# Patient Record
Sex: MALE | Race: OTHER | HISPANIC OR LATINO | URBAN - METROPOLITAN AREA
[De-identification: names, ages, dates, MRNs, and addresses within clinical notes are randomized per-mention and may not be internally consistent; named-entity substitution may affect disease eponyms.]

---

## 2020-10-05 ENCOUNTER — EMERGENCY (EMERGENCY)
Facility: HOSPITAL | Age: 40
LOS: 1 days | Discharge: HOME | End: 2020-10-05
Attending: EMERGENCY MEDICINE
Payer: MEDICAID

## 2020-10-05 VITALS
TEMPERATURE: 99 F | RESPIRATION RATE: 16 BRPM | HEIGHT: 67 IN | OXYGEN SATURATION: 98 % | HEART RATE: 81 BPM | DIASTOLIC BLOOD PRESSURE: 90 MMHG | SYSTOLIC BLOOD PRESSURE: 128 MMHG | WEIGHT: 184.97 LBS

## 2020-10-05 VITALS
SYSTOLIC BLOOD PRESSURE: 133 MMHG | OXYGEN SATURATION: 95 % | HEART RATE: 84 BPM | TEMPERATURE: 99 F | DIASTOLIC BLOOD PRESSURE: 91 MMHG | RESPIRATION RATE: 18 BRPM

## 2020-10-05 PROCEDURE — 90715 TDAP VACCINE 7 YRS/> IM: CPT

## 2020-10-05 PROCEDURE — 99284 EMERGENCY DEPT VISIT MOD MDM: CPT

## 2020-10-05 PROCEDURE — 90471 IMMUNIZATION ADMIN: CPT

## 2020-10-05 PROCEDURE — 99283 EMERGENCY DEPT VISIT LOW MDM: CPT | Mod: 25

## 2020-10-05 RX ORDER — TETANUS TOXOID, REDUCED DIPHTHERIA TOXOID AND ACELLULAR PERTUSSIS VACCINE, ADSORBED 5; 2.5; 8; 8; 2.5 [IU]/.5ML; [IU]/.5ML; UG/.5ML; UG/.5ML; UG/.5ML
0.5 SUSPENSION INTRAMUSCULAR ONCE
Refills: 0 | Status: COMPLETED | OUTPATIENT
Start: 2020-10-05 | End: 2020-10-05

## 2020-10-05 RX ADMIN — TETANUS TOXOID, REDUCED DIPHTHERIA TOXOID AND ACELLULAR PERTUSSIS VACCINE, ADSORBED 0.5 MILLILITER(S): 5; 2.5; 8; 8; 2.5 SUSPENSION INTRAMUSCULAR at 15:32

## 2020-10-05 NOTE — ED PROVIDER NOTE - PHYSICAL EXAMINATION
Attn - alert, nad, abrasions about face. PERRL 3 mm, no nystagmus, tender R frontal area, neck/spine-, chest/ribs-, sl. tender R AC joint, no swelling or deformity, abdo-, pelvis/hips-, Extrem-, skin - as above, Neuro - intact and nonfocal, normal gait.
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
## 2020-10-05 NOTE — ED PROVIDER NOTE - NSFOLLOWUPINSTRUCTIONS_ED_ALL_ED_FT
- stay hydrated.   - take tylenol 975mg and ibuprofen 600mg every 6 hours as needed for pain-take with meals.  -limit use of television/computer screen as this can make symptoms worse.    - follow up with your pcp in 1-2 days.  - follow up with our concussion clinic if symptoms persist for more than 7 days, or sooner if desired. Call [phone] to make an appointment   - return if symptoms worsen, fever, weakness, numbness/tingling, blurred vision, difficulty ambulating, vomiting more than 2x a day and all other concerns.

## 2020-10-05 NOTE — ED PROVIDER NOTE - PATIENT PORTAL LINK FT
You can access the FollowMyHealth Patient Portal offered by [facility] by registering at the following website: [email or web address]. By joining [facility]’s FollowMyHealth portal, you will also be able to view your health information using other applications (apps) compatible with our system.
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
## 2022-10-07 NOTE — ED PROVIDER NOTE - CROS ED NEURO ALL NEG
Pt with hypo-osmolar hyponatremia in setting of HCTZ use. SNa was also mildly low at 131 on admission. Pt received HTS given concerns for cerebral edema on imaging. Pt receiving IVF. Last SNa improved to 134. Avoid hypotonic fluids. Monitor BMP daily. Pt with hypo-osmolar hyponatremia in setting of HCTZ use. SNa was mildly low at 131 on admission. Pt received HTS given concerns for cerebral edema on imaging. Pt. receiving IVF. Last SNa improved to 134. Restrict fluid intake (<1L/day). Avoid hypotonic fluids. Monitor BMP daily.    Will discontinue follow-up. Reconsult as needed. - - -

## 2023-06-27 NOTE — ED PROVIDER NOTE - MDM ORDERS SUBMITTED SELECTION
Medications Bilateral Helical Rim Advancement Flap Text: The defect edges were debeveled with a #15 blade scalpel.  Given the location of the defect and the proximity to free margins (helical rim) a bilateral helical rim advancement flap was deemed most appropriate.  Using a sterile surgical marker, the appropriate advancement flaps were drawn incorporating the defect and placing the expected incisions between the helical rim and antihelix where possible.  The area thus outlined was incised through and through with a #15 scalpel blade.  With a skin hook and iris scissors, the flaps were gently and sharply undermined and freed up.